# Patient Record
Sex: MALE | Race: WHITE
[De-identification: names, ages, dates, MRNs, and addresses within clinical notes are randomized per-mention and may not be internally consistent; named-entity substitution may affect disease eponyms.]

---

## 2018-05-13 ENCOUNTER — HOSPITAL ENCOUNTER (EMERGENCY)
Dept: HOSPITAL 41 - JD.ED | Age: 23
Discharge: HOME | End: 2018-05-13
Payer: COMMERCIAL

## 2018-05-13 VITALS — SYSTOLIC BLOOD PRESSURE: 140 MMHG | DIASTOLIC BLOOD PRESSURE: 86 MMHG

## 2018-05-13 DIAGNOSIS — Z88.2: ICD-10-CM
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
## 2018-05-13 DIAGNOSIS — S01.01XA: ICD-10-CM
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
## 2018-05-13 DIAGNOSIS — S06.0X1A: Primary | ICD-10-CM

## 2018-05-13 DIAGNOSIS — F10.129: ICD-10-CM

## 2018-05-13 PROCEDURE — 93005 ELECTROCARDIOGRAM TRACING: CPT

## 2018-05-13 PROCEDURE — 80306 DRUG TEST PRSMV INSTRMNT: CPT

## 2018-05-13 PROCEDURE — 12002 RPR S/N/AX/GEN/TRNK2.6-7.5CM: CPT

## 2018-05-13 PROCEDURE — 85025 COMPLETE CBC W/AUTO DIFF WBC: CPT

## 2018-05-13 PROCEDURE — 99285 EMERGENCY DEPT VISIT HI MDM: CPT

## 2018-05-13 PROCEDURE — 36415 COLL VENOUS BLD VENIPUNCTURE: CPT

## 2018-05-13 PROCEDURE — 70450 CT HEAD/BRAIN W/O DYE: CPT

## 2018-05-13 PROCEDURE — 83735 ASSAY OF MAGNESIUM: CPT

## 2018-05-13 PROCEDURE — 80053 COMPREHEN METABOLIC PANEL: CPT

## 2018-05-13 PROCEDURE — 72125 CT NECK SPINE W/O DYE: CPT

## 2018-05-13 PROCEDURE — G0480 DRUG TEST DEF 1-7 CLASSES: HCPCS

## 2018-05-13 PROCEDURE — 96360 HYDRATION IV INFUSION INIT: CPT

## 2018-05-13 NOTE — CT
CT cervical spine

 

Technique: Multiple axial sections were obtained from above C1 

inferiorly to the top of T3.  Reconstructed sagittal and coronal 

images were reviewed.

 

Comparison: No prior cervical spine imaging.

 

Findings: Vertebral body heights and disc spaces are maintained.  

Vertebral bodies and posterior arches are intact.  No fracture is 

seen.  No bony central or bony neural foraminal stenosis is seen.  No 

abnormal subluxation is seen on the reconstructed sagittal images.

 

Impression:

1.  Nothing acute is appreciated on CT study of the cervical spine.

 

Diagnostic code #1

 

Agree with preliminary report issued by [facility] ([facility] 

preliminary report dictated on 05/13/18, 4:36 AM Central Time)

## 2018-05-13 NOTE — EDM.PDOC
ED HPI GENERAL MEDICAL PROBLEM





- General


Chief Complaint: Head Injury


Stated Complaint: FALL POSS HEAD INJURY


Time Seen by Provider: 05/13/18 02:09


Source of Information: Reports: Patient


History Limitations: Reports: No Limitations





- History of Present Illness


INITIAL COMMENTS - FREE TEXT/NARRATIVE: 





24 y/o M presents with head injury. Has been drinking this evening. Fell down 

several stairs. Hit his head on a concrete floor. He did pass out, unknown what 

the duration of LOC was. He complains of a wound to the back of the head. Was 

apparently bleeding copiously prior to arrival. Bleeding improved upon arrival. 

No confusion. Denies neck pain. No back pain. No chest pain/SOB. No abd pain/

vomiting. Denies extremity injury. 


  ** Headache


Pain Score (Numeric/FACES): 4





- Related Data


 Allergies











Allergy/AdvReac Type Severity Reaction Status Date / Time


 


Sulfa (Sulfonamide Allergy  Swollen Verified 05/13/18 02:09





Antibiotics)   Eyes  











Home Meds: 


 Home Meds





. [No Known Home Meds]  05/13/18 [History]











Past Medical History


Other HEENT History: sinus surgery





- Past Surgical History


HEENT Surgical History: Reports: Tonsillectomy





Social & Family History





- Tobacco Use


Smoking Status *Q: Never Smoker





- Caffeine Use


Caffeine Use: Reports: None





- Recreational Drug Use


Recreational Drug Use: No





ED ROS GENERAL





- Review of Systems


Review Of Systems: See Below


Constitutional: Denies: Fever


HEENT: Denies: Vision Change


Respiratory: Denies: Shortness of Breath


Cardiovascular: Reports: No Symptoms.  Denies: Chest Pain


Endocrine: Reports: No Symptoms


GI/Abdominal: Denies: Abdominal Pain


: Reports: No Symptoms


Musculoskeletal: Denies: Neck Pain


Skin: Reports: No Symptoms


Neurological: Reports: Headache


Psychiatric: Reports: No Symptoms


Hematologic/Lymphatic: Reports: No Symptoms





ED EXAM, HEAD INJURY





- Physical Exam


Exam: See Below


Exam Limited By: No Limitations


General Appearance: Alert, WD/WN, No Apparent Distress, Other (appears mildly 

intoxicated )


Head: Normocephalic, Scalp Lacerations, Scalp Ecchymosis, Scalp Hematoma (

occipital scalp, 4 cm laceration, subcutaneous, minimal bleeding at this time, 

+ large scalp hematoma )


Nexus Criteria: No: Focal Neurological Deficit


Eyes: Bilateral Eye: Normal Inspection


Ears: Normal External Exam


Nose: Normal Inspection, No Blood


Throat/Mouth: Normal Inspection, Normal Voice, No Airway Compromise


Neck: Non-Tender, Full Range of Motion, Normal Alignment, Normal Inspection


Respiratory: No Respiratory Distress, Lungs Clear, Normal Breath Sounds, Chest 

Non-Tender


Cardiovascular: Tachycardia


GI/Abdominal Exam: Soft, Non-Tender, No Distention.  No: Rebound


Back Exam: Normal Inspection.  No: CVA Tenderness (L), CVA Tenderness (R), 

Vertebral Tenderness


Extremities: Normal Inspection, Non-Tender


Neurologic: CNs II-XII nml As Tested, No Motor/Sensory Deficits, Alert, Normal 

Mood/Affect, Oriented x 3


Skin: Normal Color, Warm/Dry





ED LACERATION/WOUND & RUBIO PROC





- Laceration/Wound Repair


  ** Middle Posterior Midline Head


Lac/wound length in cm: 5


Appearance: Subcutaneous, Clean


Distal NVT: Neuro & Vascular Intact


Local Anesthesia - Lidocaine (Xylocaine): 1% with EPI


Local Anesthetic Volume: 4cc


Skin Prep: Providone-Iodine (Betadine)


Exploration/Debridement/Repair: Wound Explored, In a Bloodless Field, Explored 

to Base, No Foreign Material Found


Suture Size: 4-0


# of Sutures: 3


Suture Type: Prolene, Interrupted, Simple


Tetanus Status Addressed: Yes


Complications: No





Course





- Vital Signs


Last Recorded V/S: 


 Last Vital Signs











Temp  36.6 C   05/13/18 02:06


 


Pulse  91   05/13/18 03:49


 


Resp  16   05/13/18 03:49


 


BP  140/86   05/13/18 02:06


 


Pulse Ox  98   05/13/18 03:49














- Orders/Labs/Meds


Orders: 


 Active Orders 24 hr











 Category Date Time Status


 


 EKG 12 Lead [EKG Documentation Completion] [RC] STAT Care  05/13/18 02:39 

Active


 


 EKG 12 Lead [EKG Documentation Completion] [RC] STAT Care  05/13/18 03:34 

Active


 


 Peripheral IV Care [RC] .AS DIRECTED Care  05/13/18 02:40 Active


 


 Peripheral IV Care [RC] .AS DIRECTED Care  05/13/18 02:40 Active


 


 Cervical Spine wo Cont [CT] Stat Exams  05/13/18 02:42 Taken


 


 Head wo Cont [CT] Stat Exams  05/13/18 02:29 Taken


 


 DRUG SCREEN, URINE [URCHEM] Stat Lab  05/13/18 03:25 Ordered


 


 Peripheral IV Insertion Adult [OM.PC] Routine Oth  05/13/18 02:40 Ordered











Labs: 


 Laboratory Tests











  05/13/18 05/13/18 05/13/18 Range/Units





  03:25 03:30 03:30 


 


WBC   6.50   (4.23-9.07)  K/mm3


 


RBC   5.67   (4.63-6.08)  M/mm3


 


Hgb   17.1   (13.7-17.5)  gm/L


 


Hct   49.7   (40.1-51.0)  %


 


MCV   87.7   (79.0-92.2)  fl


 


MCH   30.2   (25.7-32.2)  pg


 


MCHC   34.4   (32.2-35.5)  g/dl


 


RDW Std Deviation   42.6   (35.1-43.9)  fL


 


Plt Count   330   (163-337)  K/mm3


 


MPV   9.3 L   (9.4-12.3)  fl


 


Neut % (Auto)   58.4   (34.0-67.9)  %


 


Lymph % (Auto)   26.8   (21.8-53.1)  %


 


Mono % (Auto)   12.3 H   (5.3-12.2)  %


 


Eos % (Auto)   1.7   (0.8-7.0)  


 


Baso % (Auto)   0.5   (0.1-1.2)  %


 


Neut # (Auto)   3.80   (1.78-5.38)  K/mm3


 


Lymph # (Auto)   1.74   (1.32-3.57)  K/mm3


 


Mono # (Auto)   0.80   (0.30-0.82)  K/mm3


 


Eos # (Auto)   0.11   (0.04-0.54)  K/mm3


 


Baso # (Auto)   0.03   (0.01-0.08)  K/mm3


 


Sodium    148 H  (136-145)  mEq/L


 


Potassium    4.0  (3.5-5.1)  mEq/L


 


Chloride    111 H  ()  mEq/L


 


Carbon Dioxide    24  (21-32)  mEq/L


 


Anion Gap    17.0 H  (5-15)  


 


BUN    13  (7-18)  mg/dL


 


Creatinine    1.0  (0.7-1.3)  mg/dL


 


Est Cr Clr Drug Dosing    128.26  mL/min


 


Estimated GFR (MDRD)    > 60  (>60)  mL/min


 


BUN/Creatinine Ratio    13.0 L  (14-18)  


 


Glucose    108 H  ()  mg/dL


 


Calcium    9.3  (8.5-10.1)  mg/dL


 


Magnesium    2.2  (1.8-2.4)  mg/dl


 


Total Bilirubin    0.3  (0.2-1.0)  mg/dL


 


AST    14 L  (15-37)  U/L


 


ALT    39  (16-63)  U/L


 


Alkaline Phosphatase    65  ()  U/L


 


Total Protein    7.9  (6.4-8.2)  g/dl


 


Albumin    4.3  (3.4-5.0)  g/dl


 


Globulin    3.6  gm/dL


 


Albumin/Globulin Ratio    1.2  (1-2)  


 


Urine Opiates Screen  Negative    (NEGATIVE)  


 


Ur Buprenorphine Scrn  Negative    (NEGATIVE)  


 


Ur Oxycodone Screen  Negative    (NEGATIVE)  


 


Urine Methadone Screen  Negative    (NEGATIVE)  


 


Ur Propoxyphene Screen  Negative    (NEGATIVE)  


 


Ur Barbiturates Screen  Negative    (NEGATIVE)  


 


Ur Tricyclics Screen  Negative    (NEGATIVE)  


 


Ur Phencyclidine Scrn  Negative    (NEGATIVE)  


 


Ur Amphetamine Screen  Negative    (NEGATIVE)  


 


U Methamphetamines Scrn  Negative    (NEGATIVE)  


 


U Benzodiazepines Scrn  Negative    (NEGATIVE)  


 


U Cocaine Metab Screen  Negative    (NEGATIVE)  


 


U Marijuana (THC) Screen  Negative    (NEGATIVE)  


 


Ethyl Alcohol     (0.00)  gm%














  05/13/18 Range/Units





  03:30 


 


WBC   (4.23-9.07)  K/mm3


 


RBC   (4.63-6.08)  M/mm3


 


Hgb   (13.7-17.5)  gm/L


 


Hct   (40.1-51.0)  %


 


MCV   (79.0-92.2)  fl


 


MCH   (25.7-32.2)  pg


 


MCHC   (32.2-35.5)  g/dl


 


RDW Std Deviation   (35.1-43.9)  fL


 


Plt Count   (163-337)  K/mm3


 


MPV   (9.4-12.3)  fl


 


Neut % (Auto)   (34.0-67.9)  %


 


Lymph % (Auto)   (21.8-53.1)  %


 


Mono % (Auto)   (5.3-12.2)  %


 


Eos % (Auto)   (0.8-7.0)  


 


Baso % (Auto)   (0.1-1.2)  %


 


Neut # (Auto)   (1.78-5.38)  K/mm3


 


Lymph # (Auto)   (1.32-3.57)  K/mm3


 


Mono # (Auto)   (0.30-0.82)  K/mm3


 


Eos # (Auto)   (0.04-0.54)  K/mm3


 


Baso # (Auto)   (0.01-0.08)  K/mm3


 


Sodium   (136-145)  mEq/L


 


Potassium   (3.5-5.1)  mEq/L


 


Chloride   ()  mEq/L


 


Carbon Dioxide   (21-32)  mEq/L


 


Anion Gap   (5-15)  


 


BUN   (7-18)  mg/dL


 


Creatinine   (0.7-1.3)  mg/dL


 


Est Cr Clr Drug Dosing   mL/min


 


Estimated GFR (MDRD)   (>60)  mL/min


 


BUN/Creatinine Ratio   (14-18)  


 


Glucose   ()  mg/dL


 


Calcium   (8.5-10.1)  mg/dL


 


Magnesium   (1.8-2.4)  mg/dl


 


Total Bilirubin   (0.2-1.0)  mg/dL


 


AST   (15-37)  U/L


 


ALT   (16-63)  U/L


 


Alkaline Phosphatase   ()  U/L


 


Total Protein   (6.4-8.2)  g/dl


 


Albumin   (3.4-5.0)  g/dl


 


Globulin   gm/dL


 


Albumin/Globulin Ratio   (1-2)  


 


Urine Opiates Screen   (NEGATIVE)  


 


Ur Buprenorphine Scrn   (NEGATIVE)  


 


Ur Oxycodone Screen   (NEGATIVE)  


 


Urine Methadone Screen   (NEGATIVE)  


 


Ur Propoxyphene Screen   (NEGATIVE)  


 


Ur Barbiturates Screen   (NEGATIVE)  


 


Ur Tricyclics Screen   (NEGATIVE)  


 


Ur Phencyclidine Scrn   (NEGATIVE)  


 


Ur Amphetamine Screen   (NEGATIVE)  


 


U Methamphetamines Scrn   (NEGATIVE)  


 


U Benzodiazepines Scrn   (NEGATIVE)  


 


U Cocaine Metab Screen   (NEGATIVE)  


 


U Marijuana (THC) Screen   (NEGATIVE)  


 


Ethyl Alcohol  0.21  (0.00)  gm%











Meds: 


Medications














Discontinued Medications














Generic Name Dose Route Start Last Admin





  Trade Name Freq  PRN Reason Stop Dose Admin


 


Sodium Chloride  1,000 mls @ 1,000 mls/hr  05/13/18 02:42  05/13/18 03:10





  Normal Saline  IV  05/13/18 03:41  1,000 mls/hr





  ONETIME ONE   Administration





     





     





     





     


 


Magnesium Sulfate 2 gm/ Premix  50 mls @ 50 mls/hr  05/13/18 03:16  05/13/18 04:

09





  IV  05/13/18 04:15  Not Given





  ONETIME ONE   





     





     





     





     


 


Lidocaine/Epinephrine  20 ml  05/13/18 02:29  05/13/18 03:10





  Xylocaine 1% With Epinephrine 1:100,000  INJECT  05/13/18 02:30  20 ml





  ONETIME ONE   Administration





     





     





     





     


 


Metoprolol Tartrate  5 mg  05/13/18 03:16  05/13/18 04:09





  Lopressor  IVPUSH  05/13/18 03:17  Not Given





  ONETIME ONE   





     





     





     





     


 


Sodium Chloride  10 ml  05/13/18 02:40  05/13/18 03:10





  Saline Flush  FLUSH   10 ml





  ASDIRECTED PRN   Administration





  Keep Vein Open   





     





     





     














- Re-Assessments/Exams


Free Text/Narrative Re-Assessment/Exam: 





05/13/18 03:30


Patient was noted to be quite tachycardic upon arrival with -144. He had 

no specific complaint related to this. Denied palpitations/CP/SOB. EKG shows 

ectopic atrial tachycardia. He has no prior cardiac history. I suspect that 

this may be an episode triggered by adrenergic tone in the setting of acute 

stress related to his head injury. Labs pending. After return from head CT, he 

spontaneously converted to NSR with a rate of 80-90. Discussed arrhythmia with 

patient and his parents. They aren't aware of him having had an episode of this 

before. 


05/13/18 03:36





05/13/18 03:49


Repeat EKG shows NSR with a rate of 89, normal intervals, no significant ST/T 

abnormality. CT head and C spine were both negative.


05/13/18 04:00 labs show normal CBC and chemistries.  ETOH elevated at .210. Pt 

is feeling well and would like to go home. He is currently living with his 

parents, who feel comfortable taking him home. 








Departure





- Departure


Time of Disposition: 05:15


Disposition: Home, Self-Care 01


Clinical Impression: 


Scalp laceration


Qualifiers:


 Encounter type: initial encounter Qualified Code(s): S01.01XA - Laceration 

without foreign body of scalp, initial encounter





Closed head injury with concussion


Qualifiers:


 Encounter type: initial encounter Loss of consciousness presence/duration: 

with LOC of 30 min or less Qualified Code(s): S06.0X1A - Concussion with loss 

of consciousness of 30 minutes or less, initial encounter





Alcohol intoxication


Qualifiers:


 Complication of substance-induced condition: uncomplicated Qualified Code(s): 

F10.920 - Alcohol use, unspecified with intoxication, uncomplicated








- Discharge Information


Instructions:  Head Injury, Adult, Laceration Care, Adult


Referrals: 


Tamy Gong, NP [Primary Care Provider] - 


Forms:  ED Department Discharge


Additional Instructions: 


1. Rest. Avoid activities that could result in a new head injury until cleared 

by your primary care provider. 


2. Take ibuprofen and/or acetaminophen as needed for pain. 


3. Keep wound clean and dry. OK to shower/wash.


4. Sutures should be removed a week from Monday (May 21) by your primary care 

provider or at the walk in clinic. Call [phone] if you'd like to schedule 

suture removal at the walk in clinic here.


5. You had an episode of an Ectopic Atrial Tachycardia arrhythmia while you 

were in the Emergency Department. Because it resolved on it's own, it didn't 

require any treatment. It was probably related to the stress of tonight's 

activities. At this point you don't need any further testing, but if you get 

palpitations/feeling of your heart racing that doesn't stop on it's own, you 

should be checked again in the Emergency Department. If you have further 

episodes of this arrhythmia, you should also be checked by a cardiologist.  





- My Orders


Last 24 Hours: 


My Active Orders





05/13/18 02:29


Head wo Cont [CT] Stat 





05/13/18 02:39


EKG 12 Lead [EKG Documentation Completion] [RC] STAT 





05/13/18 02:40


Peripheral IV Care [RC] .AS DIRECTED 


Peripheral IV Care [RC] .AS DIRECTED 


Peripheral IV Insertion Adult [OM.PC] Routine 





05/13/18 02:42


Cervical Spine wo Cont [CT] Stat 





05/13/18 03:25


DRUG SCREEN, URINE [URCHEM] Stat 





05/13/18 03:34


EKG 12 Lead [EKG Documentation Completion] [RC] STAT 














- Assessment/Plan


Last 24 Hours: 


My Active Orders





05/13/18 02:29


Head wo Cont [CT] Stat 





05/13/18 02:39


EKG 12 Lead [EKG Documentation Completion] [RC] STAT 





05/13/18 02:40


Peripheral IV Care [RC] .AS DIRECTED 


Peripheral IV Care [RC] .AS DIRECTED 


Peripheral IV Insertion Adult [OM.PC] Routine 





05/13/18 02:42


Cervical Spine wo Cont [CT] Stat 





05/13/18 03:25


DRUG SCREEN, URINE [URCHEM] Stat 





05/13/18 03:34


EKG 12 Lead [EKG Documentation Completion] [RC] STAT

## 2018-05-13 NOTE — CT
Head CT

 

Technique: Multiple axial sections through the brain were obtained.  

Intravenous contrast was not utilized.

 

Parison: No prior intracranial imaging.

 

Findings: Ventricles along with basal cisterns and sulci over the 

convexities are within normal limits for the patient's age.  No 

abnormal parenchymal densities are seen.  No evidence of intracranial 

hemorrhage.  No midline shift or mass effect is seen.  Soft tissue 

swelling noted within the posterior left scalp.

 

Bone window settings were reviewed which show the visualized sinuses 

to appear clear.  No acute calvarial abnormality is seen.

 

Impression:

1.  Soft tissue swelling within the posterior left scalp.

2.  No acute intracranial abnormality is seen.  No acute skull 

abnormality is seen.

 

Diagnostic code #2

 

I agree with preliminary report issued by [employer] [facility] ([facility] 

preliminary report dictated on 05/13/18, 4:34 AM Central Time)